# Patient Record
Sex: FEMALE | Race: WHITE | NOT HISPANIC OR LATINO
[De-identification: names, ages, dates, MRNs, and addresses within clinical notes are randomized per-mention and may not be internally consistent; named-entity substitution may affect disease eponyms.]

---

## 2017-07-06 ENCOUNTER — RX ONLY (OUTPATIENT)
Age: 62
Setting detail: RX ONLY
End: 2017-07-06

## 2017-07-06 RX ORDER — LIDOCAINE, PRILOCAINE 25; 25 MG/G; MG/G
CREAM TOPICAL
Qty: 1 | Refills: 1 | Status: ERX

## 2017-07-12 ENCOUNTER — APPOINTMENT (RX ONLY)
Dept: URBAN - METROPOLITAN AREA CLINIC 170 | Facility: CLINIC | Age: 62
Setting detail: DERMATOLOGY
End: 2017-07-12

## 2017-07-12 DIAGNOSIS — Z41.9 ENCOUNTER FOR PROCEDURE FOR PURPOSES OTHER THAN REMEDYING HEALTH STATE, UNSPECIFIED: ICD-10-CM

## 2017-07-12 PROCEDURE — ? FILLERS

## 2017-07-12 PROCEDURE — ? DYSPORT

## 2017-07-12 ASSESSMENT — LOCATION DETAILED DESCRIPTION DERM
LOCATION DETAILED: RIGHT INFERIOR MEDIAL FOREHEAD
LOCATION DETAILED: RIGHT CENTRAL MALAR CHEEK
LOCATION DETAILED: RIGHT MEDIAL MALAR CHEEK

## 2017-07-12 ASSESSMENT — LOCATION ZONE DERM: LOCATION ZONE: FACE

## 2017-07-12 ASSESSMENT — LOCATION SIMPLE DESCRIPTION DERM
LOCATION SIMPLE: RIGHT CHEEK
LOCATION SIMPLE: RIGHT FOREHEAD

## 2017-07-12 NOTE — PROCEDURE: FILLERS
Brows Filler  Volume In Cc: 0
Expiration Date (Month Year): 5/15/16
Expiration Date (Month Year): 4/30/19
Lot #: [id number]
Post-Care Instructions: Patient instructed to apply ice to reduce swelling.
Additional Area 1 Location: Face
Anesthesia Volume In Cc: 0.2
Anesthesia Type: 1% lidocaine with epinephrine and a 1:10 solution of 8.4% sodium bicarbonate
Topical Anesthesia?: 2.5% lidocaine, 2.5% prilocaine
Detail Level: Zone
Price (Use Numbers Only, No Special Characters Or $): 548
Use Map Statement For Sites (Optional): Yes
Lot #: [id number]
Consent: Written consent obtained. Risks include but not limited to bruising, beading, irregular texture, ulceration, infection, allergic reaction, scar formation, incomplete augmentation, temporary nature, procedural pain.
Expiration Date (Month Year): 2/13/19
Additional Area 1 Volume In Cc: 1
Lot #: [id number]
Map Statment: See Attach Map for Complete Details
Filler: Juvederm Volbella XC

## 2017-07-12 NOTE — PROCEDURE: DYSPORT
Dilution (U/ 0.1cc): 10
Inferior Lateral Orbicularis Oculi Units: 0
Lot #: [id number]
Expiration Date (Month Year): 10/31/17
Post-Care Instructions: Patient instructed to not lie down for 4 hours and limit physical activity for 24 hours.
Additional Area 1 Location: Face
Additional Area 1 Units: 148
Detail Level: Zone
Price (Use Numbers Only, No Special Characters Or $): 777
Consent: Written consent obtained. Risks include but not limited to lid/brow ptosis, bruising, swelling, diplopia, temporary effect, incomplete chemical denervation.

## 2017-10-26 ENCOUNTER — RX ONLY (OUTPATIENT)
Age: 62
Setting detail: RX ONLY
End: 2017-10-26

## 2017-10-26 RX ORDER — PHARMACY COMPOUNDING ACCESSORY
EACH MISCELLANEOUS
Qty: 1 | Refills: 1 | Status: ERX | COMMUNITY
Start: 2017-10-26

## 2017-11-08 ENCOUNTER — APPOINTMENT (RX ONLY)
Dept: URBAN - METROPOLITAN AREA CLINIC 170 | Facility: CLINIC | Age: 62
Setting detail: DERMATOLOGY
End: 2017-11-08

## 2017-11-08 DIAGNOSIS — Z41.9 ENCOUNTER FOR PROCEDURE FOR PURPOSES OTHER THAN REMEDYING HEALTH STATE, UNSPECIFIED: ICD-10-CM

## 2017-11-08 PROCEDURE — ? FRAXEL

## 2017-11-08 NOTE — PROCEDURE: FRAXEL
Location: full face except eyelids
Price (Use Numbers Only, No Special Characters Or $): 1400
Energy(Mj/Cm2): 20
Total Energy In Kj (Optional- Don't Include Units): 1.27
Number Of Passes: 4
Treatment Level: 8
Energy(Mj/Cm2): 0
Topical Anesthesia Type: 23% Lidocaine 7% Tetracaine
Detail Level: Zone
Treatment Level: 7
Total Coverage: 35%
Treatment Level: 6
Location: forehead
Wavelength: 1550nm
Energy(Mj/Cm2): 15
Total Coverage: 45%
Total Energy In Kj (Optional- Don't Include Units): 1.89
Location: neck
Wavelength: 1927nm
Length Of Topical Anesthesia Application (Optional): 60 minutes
Treatment Number: 1
Consent: Written consent obtained, risks reviewed including but not limited to pain and incomplete improvement of sun damage.
Add Post-Care Below To The Note: No
Post-Care Instructions: I reviewed with the patient in detail post-care instructions. Patient should avoid sun until area fully healed.
Indication: resurfacing
Total Coverage: 50%
Energy(Mj/Cm2): 20
External Cooling: Tyrel Cryo 6

## 2018-05-22 ENCOUNTER — RX ONLY (OUTPATIENT)
Age: 63
Setting detail: RX ONLY
End: 2018-05-22

## 2018-05-22 RX ORDER — LIDOCAINE, PRILOCAINE 25; 25 MG/G; MG/G
CREAM TOPICAL
Qty: 1 | Refills: 1 | Status: ERX

## 2018-06-27 ENCOUNTER — APPOINTMENT (RX ONLY)
Dept: URBAN - METROPOLITAN AREA CLINIC 170 | Facility: CLINIC | Age: 63
Setting detail: DERMATOLOGY
End: 2018-06-27

## 2018-06-27 DIAGNOSIS — Z41.9 ENCOUNTER FOR PROCEDURE FOR PURPOSES OTHER THAN REMEDYING HEALTH STATE, UNSPECIFIED: ICD-10-CM

## 2018-06-27 PROCEDURE — ? FILLERS

## 2018-06-27 PROCEDURE — ? PATIENT SPECIFIC COUNSELING

## 2018-06-27 PROCEDURE — ? DYSPORT

## 2018-06-27 ASSESSMENT — LOCATION DETAILED DESCRIPTION DERM: LOCATION DETAILED: LEFT INFERIOR CENTRAL MALAR CHEEK

## 2018-06-27 ASSESSMENT — LOCATION SIMPLE DESCRIPTION DERM: LOCATION SIMPLE: LEFT CHEEK

## 2018-06-27 ASSESSMENT — LOCATION ZONE DERM: LOCATION ZONE: FACE

## 2018-06-27 NOTE — PROCEDURE: DYSPORT
Additional Area 6 Units: 0
Expiration Date (Month Year): 12/31/18
Lot #: [id number]
Post-Care Instructions: Patient instructed to not lie down for 4 hours and limit physical activity for 24 hours.
Detail Level: Zone
Dilution (U/ 0.1cc): 10
Price (Use Numbers Only, No Special Characters Or $): 678
Additional Area 1 Location: Face
Additional Area 1 Units: 148
Consent: Written consent obtained. Risks include but not limited to lid/brow ptosis, bruising, swelling, diplopia, temporary effect, incomplete chemical denervation.

## 2018-06-27 NOTE — PROCEDURE: PATIENT SPECIFIC COUNSELING
Recommend Alastin Restorative Skin Complex twice a day to face & neck\\nPlan Fraxel as needed
Detail Level: Zone

## 2018-06-27 NOTE — PROCEDURE: FILLERS
Additional Area 1 Volume In Cc: 0
Additional Area 1 Location: Face
Additional Area 1 Volume In Cc: 2
Price (Use Numbers Only, No Special Characters Or $): 1000
Include Cannula Size?: 25G
Consent: Written consent obtained. Risks include but not limited to bruising, beading, irregular texture, ulceration, infection, allergic reaction, scar formation, incomplete augmentation, temporary nature, procedural pain.
Topical Anesthesia?: 2.5% lidocaine, 2.5% prilocaine
Include Cannula Length?: 1.5 inch
Map Statment: See Attach Map for Complete Details
Lot #: [id number]
Expiration Date (Month Year): 9/30/19, 8/21/19
Expiration Date (Month Year): 11/30/18
Post-Care Instructions: Patient instructed to apply ice to reduce swelling.
Lot #: [id number]
Anesthesia Volume In Cc: 0.2
Include Cannula Information In Note?: Yes
Expiration Date (Month Year): 5/15/16
Detail Level: Zone
Filler: Juvederm Volbella XC
Anesthesia Type: 2% lidocaine with epinephrine
Lot #: [id number]

## 2018-09-06 ENCOUNTER — APPOINTMENT (RX ONLY)
Dept: URBAN - METROPOLITAN AREA CLINIC 166 | Facility: CLINIC | Age: 63
Setting detail: DERMATOLOGY
End: 2018-09-06

## 2018-09-06 DIAGNOSIS — E03.8 OTHER SPECIFIED HYPOTHYROIDISM: ICD-10-CM

## 2018-09-06 PROBLEM — L30.9 DERMATITIS, UNSPECIFIED: Status: ACTIVE | Noted: 2018-09-06

## 2018-09-06 PROCEDURE — 99212 OFFICE O/P EST SF 10 MIN: CPT | Mod: 25

## 2018-09-06 PROCEDURE — ? BIOPSY BY SHAVE METHOD

## 2018-09-06 PROCEDURE — ? COUNSELING

## 2018-09-06 PROCEDURE — ? PRESCRIPTION

## 2018-09-06 PROCEDURE — 11100: CPT

## 2018-09-06 RX ORDER — CLOBETASOL PROPIONATE 0.5 MG/G
CREAM TOPICAL BID
Qty: 1 | Refills: 1 | Status: ERX

## 2018-09-06 ASSESSMENT — LOCATION DETAILED DESCRIPTION DERM
LOCATION DETAILED: RIGHT DISTAL PRETIBIAL REGION
LOCATION DETAILED: LEFT PROXIMAL PRETIBIAL REGION
LOCATION DETAILED: RIGHT PROXIMAL PRETIBIAL REGION

## 2018-09-06 ASSESSMENT — LOCATION SIMPLE DESCRIPTION DERM
LOCATION SIMPLE: LEFT PRETIBIAL REGION
LOCATION SIMPLE: RIGHT PRETIBIAL REGION

## 2018-09-06 ASSESSMENT — LOCATION ZONE DERM: LOCATION ZONE: LEG

## 2018-09-06 NOTE — PROCEDURE: COUNSELING
Detail Level: Simple
Patient Specific Counseling (Will Not Stick From Patient To Patient): She is seeing her endocrinologist next week and will discuss thyroid status

## 2018-09-06 NOTE — PROCEDURE: BIOPSY BY SHAVE METHOD
Wound Care: Aquaphor
Destruction After The Procedure: No
X Size Of Lesion In Cm: 0
Silver Nitrate Text: The wound bed was treated with silver nitrate after the biopsy was performed.
Anesthesia Volume In Cc (Will Not Render If 0): 0.5
Electrodesiccation Text: The wound bed was treated with electrodesiccation after the biopsy was performed.
Biopsy Type: H and E
Post-Care Instructions: I reviewed with the patient in detail post-care instructions. Patient is to keep the biopsy site dry overnight, and then apply Aquaphor twice daily until healed.
Electrodesiccation And Curettage Text: The wound bed was treated with electrodesiccation and curettage after the biopsy was performed.
Was A Bandage Applied: Yes
Notification Instructions: Patient will be notified of biopsy results. However, patient instructed to call the office if not contacted within 2 weeks.
Hemostasis: Aluminum Chloride
Curettage Text: The wound bed was treated with curettage after the biopsy was performed.
Consent: Written consent was obtained and risks were reviewed including but not limited to scarring, infection, bleeding, scabbing, incomplete removal, nerve damage and allergy to anesthesia.
Biopsy Method: Personna blade
Dressing: bandage
Detail Level: Simple
Lab Facility: [id number]
Lab: [id number]
Depth Of Biopsy: dermis
Cryotherapy Text: The wound bed was treated with cryotherapy after the biopsy was performed.
Anesthesia Type: 1% lidocaine with epinephrine 1:100,000 buffered with 8.4% sodium bicarbonate (1:9 ratio)
Type Of Destruction Used: Curettage
Billing Type: Third-Party Bill

## 2018-10-10 ENCOUNTER — APPOINTMENT (RX ONLY)
Dept: URBAN - METROPOLITAN AREA CLINIC 170 | Facility: CLINIC | Age: 63
Setting detail: DERMATOLOGY
End: 2018-10-10

## 2018-10-10 DIAGNOSIS — Z41.9 ENCOUNTER FOR PROCEDURE FOR PURPOSES OTHER THAN REMEDYING HEALTH STATE, UNSPECIFIED: ICD-10-CM

## 2018-10-10 PROCEDURE — ? FRAXEL

## 2018-10-10 ASSESSMENT — LOCATION ZONE DERM
LOCATION ZONE: NECK
LOCATION ZONE: FACE

## 2018-10-10 ASSESSMENT — LOCATION DETAILED DESCRIPTION DERM
LOCATION DETAILED: RIGHT INFERIOR LATERAL NECK
LOCATION DETAILED: RIGHT INFERIOR CENTRAL MALAR CHEEK

## 2018-10-10 ASSESSMENT — LOCATION SIMPLE DESCRIPTION DERM
LOCATION SIMPLE: RIGHT ANTERIOR NECK
LOCATION SIMPLE: RIGHT CHEEK

## 2018-10-10 NOTE — PROCEDURE: FRAXEL
Post-Care Instructions: I reviewed with the patient in detail post-care instructions. Patient should avoid sun until area fully healed.
Indication: resurfacing
Treatment Level: 8
Total Coverage: 23%
Anesthesia Volume In Cc: 0
Treatment Number: 2
Location: perioral area
External Cooling Fan Speed: 6
Energy(Mj/Cm2): 60
Location: forehead
Wavelength: 1550nm
Number Of Passes: 4
Topical Anesthesia Type: 23% Lidocaine 7% Tetracaine
Energy(Mj/Cm2): 30
External Cooling: Tyrel Cryo 6
Price (Use Numbers Only, No Special Characters Or $): 1400
Location: neck
Add Post-Care Below To The Note: No
Energy(Mj/Cm2): 15
Total Energy In Kj (Optional- Don't Include Units): 4.22
Length Of Topical Anesthesia Application (Optional): 90 minutes
Total Coverage: 20%
Total Energy In Kj (Optional- Don't Include Units): 5.03
Treatment Level: 7
Detail Level: Zone
Energy(Mj/Cm2): 20
Consent: Written consent obtained, risks reviewed including but not limited to pain and incomplete improvement of sun damage.

## 2018-10-10 NOTE — HPI: COSMETIC (LASER RESURFACING)
Have You Had Laser Resurfacing Before?: has had previous treatments
When Was Your Last Laser Resurfacing Treatment?: 11/8/17

## 2019-02-08 ENCOUNTER — APPOINTMENT (RX ONLY)
Dept: URBAN - METROPOLITAN AREA CLINIC 170 | Facility: CLINIC | Age: 64
Setting detail: DERMATOLOGY
End: 2019-02-08

## 2019-02-08 DIAGNOSIS — Z41.9 ENCOUNTER FOR PROCEDURE FOR PURPOSES OTHER THAN REMEDYING HEALTH STATE, UNSPECIFIED: ICD-10-CM

## 2019-02-08 PROCEDURE — ? DYSPORT

## 2019-02-08 ASSESSMENT — LOCATION ZONE DERM: LOCATION ZONE: FACE

## 2019-02-08 ASSESSMENT — LOCATION DETAILED DESCRIPTION DERM: LOCATION DETAILED: LEFT INFERIOR CENTRAL MALAR CHEEK

## 2019-02-08 ASSESSMENT — LOCATION SIMPLE DESCRIPTION DERM: LOCATION SIMPLE: LEFT CHEEK

## 2019-02-08 NOTE — PROCEDURE: DYSPORT
Consent: Written consent obtained. Risks include but not limited to lid/brow ptosis, bruising, swelling, diplopia, temporary effect, incomplete chemical denervation.
Additional Area 1 Location: Face
Post-Care Instructions: Patient instructed to not lie down for 4 hours and limit physical activity for 24 hours.
Additional Area 5 Units: 0
Detail Level: Zone
Lot #: [id number]
Additional Area 1 Units: 148
Price (Use Numbers Only, No Special Characters Or $): 194
Expiration Date (Month Year): 8/31/19
Dilution (U/ 0.1cc): 10

## 2019-05-21 ENCOUNTER — APPOINTMENT (RX ONLY)
Dept: URBAN - METROPOLITAN AREA CLINIC 170 | Facility: CLINIC | Age: 64
Setting detail: DERMATOLOGY
End: 2019-05-21

## 2019-05-21 DIAGNOSIS — Z41.9 ENCOUNTER FOR PROCEDURE FOR PURPOSES OTHER THAN REMEDYING HEALTH STATE, UNSPECIFIED: ICD-10-CM

## 2019-05-21 PROCEDURE — ? FRAXEL

## 2019-05-21 ASSESSMENT — LOCATION SIMPLE DESCRIPTION DERM
LOCATION SIMPLE: CHEST
LOCATION SIMPLE: RIGHT ANTERIOR NECK

## 2019-05-21 ASSESSMENT — LOCATION ZONE DERM
LOCATION ZONE: TRUNK
LOCATION ZONE: NECK

## 2019-05-21 ASSESSMENT — LOCATION DETAILED DESCRIPTION DERM
LOCATION DETAILED: RIGHT INFERIOR LATERAL NECK
LOCATION DETAILED: UPPER STERNUM

## 2019-05-21 NOTE — HPI: COSMETIC (LASER RESURFACING)
Have You Had Laser Resurfacing Before?: has had previous treatments
When Was Your Last Laser Resurfacing Treatment?: 10/10/18

## 2019-05-21 NOTE — PROCEDURE: FRAXEL
Location: neck
Topical Anesthesia Type: 23% Lidocaine 7% Tetracaine
Location: forehead
Treatment Level: 8
Energy(Mj/Cm2): 30
External Cooling Fan Speed: 6
Anesthesia Volume In Cc: 0
Price (Use Numbers Only, No Special Characters Or $): 1100
Consent: Written consent obtained, risks reviewed including but not limited to pain and incomplete improvement of sun damage.
Total Coverage: 23%
Total Coverage: 20%
Treatment Number: 3
External Cooling: Tyrel Cryo 6
Location: decollete of the chest
Energy(Mj/Cm2): 30
Detail Level: Zone
Wavelength: 1550nm
Total Energy In Kj (Optional- Don't Include Units): 4.22
Treatment Level: 7
Post-Care Instructions: I reviewed with the patient in detail post-care instructions. Patient should avoid sun until area fully healed.
Add Post-Care Below To The Note: No
Indication: resurfacing
Total Energy In Kj (Optional- Don't Include Units): 3.71
Number Of Passes: 4
Total Energy In Kj (Optional- Don't Include Units): 3.08
Length Of Topical Anesthesia Application (Optional): 90 minutes
Energy(Mj/Cm2): 15

## 2019-05-28 ENCOUNTER — RX ONLY (OUTPATIENT)
Age: 64
Setting detail: RX ONLY
End: 2019-05-28

## 2019-05-28 RX ORDER — LIDOCAINE AND PRILOCAINE 25; 25 MG/G; MG/G
CREAM TOPICAL
Qty: 1 | Refills: 1 | Status: ERX

## 2019-05-30 ENCOUNTER — APPOINTMENT (RX ONLY)
Dept: URBAN - METROPOLITAN AREA CLINIC 170 | Facility: CLINIC | Age: 64
Setting detail: DERMATOLOGY
End: 2019-05-30

## 2019-05-30 DIAGNOSIS — Z41.9 ENCOUNTER FOR PROCEDURE FOR PURPOSES OTHER THAN REMEDYING HEALTH STATE, UNSPECIFIED: ICD-10-CM

## 2019-05-30 PROCEDURE — ? DYSPORT

## 2019-05-30 PROCEDURE — ? FILLERS

## 2019-05-30 NOTE — PROCEDURE: DYSPORT
Lot #: [id number]
Depressor Anguli Oris Units: 0
Detail Level: Zone
Consent: Written consent obtained. Risks include but not limited to lid/brow ptosis, bruising, swelling, diplopia, temporary effect, incomplete chemical denervation.
Price (Use Numbers Only, No Special Characters Or $): 485
Additional Area 1 Units: 148
Dilution (U/ 0.1cc): 10
Expiration Date (Month Year): 12/31/19
Post-Care Instructions: Patient instructed to not lie down for 4 hours and limit physical activity for 24 hours.
Additional Area 1 Location: Face

## 2019-05-30 NOTE — PROCEDURE: FILLERS
Expiration Date (Month Year): 5/15/16
Detail Level: Zone
Tear Troughs Filler  Volume In Cc: 0
Additional Area 1 Volume In Cc: 1
Include Cannula Size?: 25G
Include Cannula Information In Note?: No
Price (Use Numbers Only, No Special Characters Or $): 5328
Include Cannula Length?: 1.5 inch
Additional Area 1 Location: Face
Use Map Statement For Sites (Optional): Yes
Additional Area 1 Volume In Cc: 2
Map Statment: See Attach Map for Complete Details
Consent: Written consent obtained. Risks include but not limited to bruising, beading, irregular texture, ulceration, infection, allergic reaction, scar formation, incomplete augmentation, temporary nature, procedural pain.
Post-Care Instructions: Patient instructed to apply ice to reduce swelling.
Filler: Juvederm Ultra XC
Anesthesia Type: 1% lidocaine with 1:100,000 epinephrine, 1% lidocaine without epinephrine and 8.4% sodium bicarbonate in a 2:2:1 Ratio
Lot #: [id number]
Anesthesia Volume In Cc: 0.2
Expiration Date (Month Year): 4/21/20
Lot #: [id number]
Topical Anesthesia?: 2.5% lidocaine, 2.5% prilocaine
Expiration Date (Month Year): 2/25/20
Lot #: [id number]
Filler: Voluma

## 2019-07-11 ENCOUNTER — APPOINTMENT (RX ONLY)
Dept: URBAN - METROPOLITAN AREA CLINIC 170 | Facility: CLINIC | Age: 64
Setting detail: DERMATOLOGY
End: 2019-07-11

## 2019-07-11 ENCOUNTER — RX ONLY (OUTPATIENT)
Age: 64
Setting detail: RX ONLY
End: 2019-07-11

## 2019-07-11 DIAGNOSIS — I78.8 OTHER DISEASES OF CAPILLARIES: ICD-10-CM

## 2019-07-11 DIAGNOSIS — Z41.9 ENCOUNTER FOR PROCEDURE FOR PURPOSES OTHER THAN REMEDYING HEALTH STATE, UNSPECIFIED: ICD-10-CM

## 2019-07-11 PROCEDURE — ? COUNSELING

## 2019-07-11 PROCEDURE — ? DEFER

## 2019-07-11 PROCEDURE — ? PATIENT SPECIFIC COUNSELING

## 2019-07-11 RX ORDER — TRETINOIN 1 MG/G
CREAM TOPICAL
Qty: 1 | Refills: 6 | Status: ERX

## 2019-07-11 NOTE — PROCEDURE: DEFER
Introduction Text (Please End With A Colon): The following procedure was deferred:
Detail Level: Simple
Scheduling Instructions (Optional): Cool Glide Laser

## 2019-07-11 NOTE — PROCEDURE: PATIENT SPECIFIC COUNSELING
Improvement of rhytides & volume loss; may possibly need more filler to chin (2-3 weeks after Cool Glide) \\nPatient has home microneedling device; will continue to use at this time; consider Pixel 8 RF in future
Detail Level: Zone

## 2019-08-02 ENCOUNTER — APPOINTMENT (RX ONLY)
Dept: URBAN - METROPOLITAN AREA CLINIC 170 | Facility: CLINIC | Age: 64
Setting detail: DERMATOLOGY
End: 2019-08-02

## 2019-08-02 DIAGNOSIS — Z41.9 ENCOUNTER FOR PROCEDURE FOR PURPOSES OTHER THAN REMEDYING HEALTH STATE, UNSPECIFIED: ICD-10-CM

## 2019-08-02 PROCEDURE — ? FRAXEL

## 2019-08-02 ASSESSMENT — LOCATION SIMPLE DESCRIPTION DERM
LOCATION SIMPLE: RIGHT ANTERIOR NECK
LOCATION SIMPLE: CHEST

## 2019-08-02 ASSESSMENT — LOCATION ZONE DERM
LOCATION ZONE: TRUNK
LOCATION ZONE: NECK

## 2019-08-02 ASSESSMENT — LOCATION DETAILED DESCRIPTION DERM
LOCATION DETAILED: UPPER STERNUM
LOCATION DETAILED: RIGHT INFERIOR LATERAL NECK

## 2019-08-02 NOTE — HPI: COSMETIC (LASER RESURFACING)
Have You Had Laser Resurfacing Before?: has had previous treatments
When Was Your Last Laser Resurfacing Treatment?: 5/21/19

## 2019-10-01 ENCOUNTER — APPOINTMENT (RX ONLY)
Dept: URBAN - METROPOLITAN AREA CLINIC 167 | Facility: CLINIC | Age: 64
Setting detail: DERMATOLOGY
End: 2019-10-01

## 2019-10-01 DIAGNOSIS — I78.8 OTHER DISEASES OF CAPILLARIES: ICD-10-CM

## 2019-10-01 PROCEDURE — ? LASER COSMETIC

## 2019-10-01 ASSESSMENT — LOCATION ZONE DERM: LOCATION ZONE: FACE

## 2019-10-01 ASSESSMENT — LOCATION SIMPLE DESCRIPTION DERM: LOCATION SIMPLE: LEFT CHEEK

## 2019-10-01 ASSESSMENT — LOCATION DETAILED DESCRIPTION DERM: LOCATION DETAILED: LEFT INFERIOR MEDIAL MALAR CHEEK

## 2019-10-01 NOTE — PROCEDURE: LASER COSMETIC
Spotsize (Include Units): 3mm
Treatment Number: 1
Cooling: contact
Pre-Procedure Care: Prior to the procedure the patient and all present had protective eyewear in place and a warning sign was placed on the door.
Indication: Telangiectasias
Post-Care Instructions: I reviewed with the patient in detail post-care instructions. Patient should stay away from the sun and wear sun protection until treated areas are fully healed.
Anesthesia Volume In Cc: 0
Render Post-Care In The Note: No
Wavelength (Include Units): 1064 mm
Pulse Duration (Include Units): 20 ms
Anesthesia Type: 1% lidocaine with epinephrine
End-Point And Post-Procedure Care: The procedure continued until mild purpura was noted.  Immediately following the procedure, Vaseline and ice applied. Post care reviewed with patient.
Total Pulses: 48; 4 (upper lip)
Fluence (Include Units): 170 J/cm2; 150J/cm2 (upper lip)
Price (Use Numbers Only, No Special Characters Or $): 268
Laser Type: Cool Glide
Detail Level: Zone
Consent: Prior to the procedure consent obtained, risks reviewed including but not limited to crusting, scabbing, blistering, scarring, darker or lighter pigmentary change, incidental hair removal, bruising, and/or incomplete removal.
Eye Protection: Laser Aid

## 2019-10-21 ENCOUNTER — RX ONLY (OUTPATIENT)
Age: 64
Setting detail: RX ONLY
End: 2019-10-21

## 2019-10-21 RX ORDER — LIDOCAINE AND PRILOCAINE 25; 25 MG/G; MG/G
CREAM TOPICAL
Qty: 1 | Refills: 3 | Status: ERX

## 2019-10-23 ENCOUNTER — APPOINTMENT (RX ONLY)
Dept: URBAN - METROPOLITAN AREA CLINIC 170 | Facility: CLINIC | Age: 64
Setting detail: DERMATOLOGY
End: 2019-10-23

## 2019-10-23 ENCOUNTER — RX ONLY (OUTPATIENT)
Age: 64
Setting detail: RX ONLY
End: 2019-10-23

## 2019-10-23 DIAGNOSIS — Z41.9 ENCOUNTER FOR PROCEDURE FOR PURPOSES OTHER THAN REMEDYING HEALTH STATE, UNSPECIFIED: ICD-10-CM

## 2019-10-23 PROCEDURE — ? DYSPORT

## 2019-10-23 PROCEDURE — ? PATIENT SPECIFIC COUNSELING

## 2019-10-23 PROCEDURE — ? FILLERS

## 2019-10-23 RX ORDER — LIDOCAINE AND PRILOCAINE 25; 25 MG/G; MG/G
GEL PERIODONTAL
Qty: 1 | Refills: 5 | Status: ERX | COMMUNITY
Start: 2019-10-23

## 2019-10-23 NOTE — PROCEDURE: PATIENT SPECIFIC COUNSELING
Discussed microneedling. Recommend skin pen X3 treatments $350/treatment. Pre and post treatment discussed. Handout given to patient.
Detail Level: Zone

## 2019-10-23 NOTE — PROCEDURE: DYSPORT
Expiration Date (Month Year): 2/29/20
Additional Area 2 Units: 0
Detail Level: Zone
Lot #: [id number]
Additional Area 1 Location: Face
Post-Care Instructions: Patient instructed to not lie down for 4 hours and limit physical activity for 24 hours.
Consent: Written consent obtained. Risks include but not limited to lid/brow ptosis, bruising, swelling, diplopia, temporary effect, incomplete chemical denervation.
Dilution (U/ 0.1cc): 10
Additional Area 1 Units: 148
Price (Use Numbers Only, No Special Characters Or $): 096

## 2019-10-23 NOTE — PROCEDURE: FILLERS
Additional Area 1 Location: Face
Post-Care Instructions: Patient instructed to apply ice to reduce swelling.
Tear Troughs Filler  Volume In Cc: 0
Anesthesia Type: 1% lidocaine with epinephrine 1:100,000 buffered with 8.4% sodium bicarbonate (1:9 ratio)
Lot #: [id number]
Anesthesia Volume In Cc: 0.2
Expiration Date (Month Year): 8/12/20
Include Cannula Information In Note?: Yes
Lot #: [id number]
Expiration Date (Month Year): 11/30/20 *mpc special
Topical Anesthesia?: 2.5% lidocaine, 2.5% prilocaine
Include Cannula Information In Note?: No
Lot #: [id number]
Include Cannula Size?: 25G
Filler: Juvederm Ultra XC
Detail Level: Zone
Include Cannula Length?: 1.5 inch
Expiration Date (Month Year): 5/15/16
Additional Area 1 Volume In Cc: 2
Price (Use Numbers Only, No Special Characters Or $): 1400
Map Statment: See Attach Map for Complete Details
Consent: Written consent obtained. Risks include but not limited to bruising, beading, irregular texture, ulceration, infection, allergic reaction, scar formation, incomplete augmentation, temporary nature, procedural pain.

## 2019-11-21 ENCOUNTER — RX ONLY (OUTPATIENT)
Age: 64
Setting detail: RX ONLY
End: 2019-11-21

## 2019-11-21 ENCOUNTER — APPOINTMENT (RX ONLY)
Dept: URBAN - METROPOLITAN AREA CLINIC 173 | Facility: CLINIC | Age: 64
Setting detail: DERMATOLOGY
End: 2019-11-21

## 2019-11-21 DIAGNOSIS — Z41.9 ENCOUNTER FOR PROCEDURE FOR PURPOSES OTHER THAN REMEDYING HEALTH STATE, UNSPECIFIED: ICD-10-CM

## 2019-11-21 PROCEDURE — ? SKINPEN

## 2019-11-21 RX ORDER — BIMATOPROST 0.3 MG/ML
SOLUTION/ DROPS OPHTHALMIC
Qty: 1 | Refills: 6 | Status: ERX | COMMUNITY
Start: 2019-11-21

## 2019-11-21 NOTE — PROCEDURE: SKINPEN
Location #3: Perioral, Cheeks
Consent: Written consent obtained, risks reviewed including but not limited to pain, scarring, infection and incomplete improvement.  Patient understands the procedure is cosmetic in nature and will require out of pocket payment.
Location #2: Under Eyes
Location #1: Forehead, Temples, Nose
Treatment Number (Optional): 1
Detail Level: Zone
Depth In Mm: 1.5
Post-Care Instructions: After the procedure, take precautions agains sun exposure. Do not apply sunscreen for 12 hours after the procedure. Do not apply make-up for 12 hours after the procedure. Avoid alcohol based toners for 10-14 days. After 2-3 days patients can return to their regular skin regimen.
Depth In Mm: 1.25
Depth In Mm: 0.25
Depth In Mm: 0.5
Price (Use Numbers Only, No Special Characters Or $): 968
Serum (Optional): Hydrating B5 Gel

## 2019-12-16 ENCOUNTER — APPOINTMENT (RX ONLY)
Dept: URBAN - METROPOLITAN AREA CLINIC 173 | Facility: CLINIC | Age: 64
Setting detail: DERMATOLOGY
End: 2019-12-16

## 2019-12-16 DIAGNOSIS — Z41.9 ENCOUNTER FOR PROCEDURE FOR PURPOSES OTHER THAN REMEDYING HEALTH STATE, UNSPECIFIED: ICD-10-CM

## 2019-12-16 PROCEDURE — ? SKINPEN

## 2019-12-16 NOTE — HPI: COSMETIC (MICRONEEDLING)
Have You Had Microneedling Treatments Before?: has had a previous microneedling treatment
When Was Your Last Treatment?: 11/21/19

## 2019-12-16 NOTE — PROCEDURE: SKINPEN
Price (Use Numbers Only, No Special Characters Or $): 204
Treatment Number (Optional): 2
Location #2: Under Eyes
Depth In Mm: 0.75
Post-Care Instructions: After the procedure, take precautions agains sun exposure. Do not apply sunscreen for 12 hours after the procedure. Do not apply make-up for 12 hours after the procedure. Avoid alcohol based toners for 10-14 days. After 2-3 days patients can return to their regular skin regimen.
Detail Level: Zone
Depth In Mm: 0.25
Depth In Mm: 0.5
Consent: Written consent obtained, risks reviewed including but not limited to pain, scarring, infection and incomplete improvement.  Patient understands the procedure is cosmetic in nature and will require out of pocket payment.
Serum (Optional): Hydrating B5 Gel
Location #3: Cheeks, Perioral
Location #1: Forehead, Temples, Nose
Depth In Mm: 1.25
Depth In Mm: 1.5

## 2020-01-21 ENCOUNTER — APPOINTMENT (RX ONLY)
Dept: URBAN - METROPOLITAN AREA CLINIC 173 | Facility: CLINIC | Age: 65
Setting detail: DERMATOLOGY
End: 2020-01-21

## 2020-01-21 DIAGNOSIS — Z41.9 ENCOUNTER FOR PROCEDURE FOR PURPOSES OTHER THAN REMEDYING HEALTH STATE, UNSPECIFIED: ICD-10-CM

## 2020-01-21 PROCEDURE — ? SKINPEN

## 2020-01-21 NOTE — PROCEDURE: SKINPEN
Price (Use Numbers Only, No Special Characters Or $): 870
Depth In Mm: 1.25
Post-Care Instructions: After the procedure, take precautions agains sun exposure. Do not apply sunscreen for 12 hours after the procedure. Do not apply make-up for 12 hours after the procedure. Avoid alcohol based toners for 10-14 days. After 2-3 days patients can return to their regular skin regimen.
Depth In Mm: 0.5
Depth In Mm: 1.5
Location #2: Under Eyes
Location #3: Cheeks
Treatment Number (Optional): 3
Detail Level: Zone
Consent: Written consent obtained, risks reviewed including but not limited to pain, scarring, infection and incomplete improvement.  Patient understands the procedure is cosmetic in nature and will require out of pocket payment.
Depth In Mm: 0.25
Location #1: Forehead, Temples, Nose
Serum (Optional): Hydrating B5 Gel
Depth In Mm: 0.75
Location #4: Perioral

## 2020-01-21 NOTE — HPI: COSMETIC (MICRONEEDLING)
Have You Had Microneedling Treatments Before?: has had a previous microneedling treatment
When Was Your Last Treatment?: 12/16/19

## 2021-03-05 ENCOUNTER — RX ONLY (OUTPATIENT)
Age: 66
Setting detail: RX ONLY
End: 2021-03-05

## 2021-03-05 RX ORDER — LIDOCAINE AND PRILOCAINE 25; 25 MG/G; MG/G
CREAM TOPICAL
Qty: 1 | Refills: 3 | Status: ERX

## 2021-04-01 ENCOUNTER — APPOINTMENT (RX ONLY)
Dept: URBAN - METROPOLITAN AREA CLINIC 173 | Facility: CLINIC | Age: 66
Setting detail: DERMATOLOGY
End: 2021-04-01

## 2021-04-01 DIAGNOSIS — Z41.9 ENCOUNTER FOR PROCEDURE FOR PURPOSES OTHER THAN REMEDYING HEALTH STATE, UNSPECIFIED: ICD-10-CM

## 2021-04-01 PROCEDURE — ? DYSPORT

## 2021-04-01 PROCEDURE — ? FILLERS

## 2021-04-01 NOTE — PROCEDURE: FILLERS
Filler: RHA 2
Dorsal Hands Filler  Volume In Cc: 0
Include Cannula Information In Note?: No
Additional Area 1 Location: Face
Price (Use Numbers Only, No Special Characters Or $): 2618
Use Map Statement For Sites (Optional): Yes
Additional Area 1 Volume In Cc: 1
Expiration Date (Month Year): 6/22/2023
Map Statment: See Attach Map for Complete Details
Lot #: [id number]
Filler: RHA 3
Consent: Written consent obtained. Risks include but not limited to bruising, beading, irregular texture, ulceration, infection, allergic reaction, scar formation, incomplete augmentation, temporary nature, procedural pain.
Include Cannula Size?: 25G
Expiration Date (Month Year): 7/5/23
Post-Care Instructions: Patient instructed to apply ice to reduce swelling.
Anesthesia Type: 1% lidocaine with epinephrine 1:100,000 buffered with 8.4% sodium bicarbonate (1:9 ratio)
Include Cannula Length?: 1.5 inch
Anesthesia Volume In Cc: 0.2
Lot #: [id number]
Expiration Date (Month Year): 5/15/16
Lot #: [id number]
Additional Area 1 Volume In Cc: 2
Detail Level: Zone

## 2021-04-01 NOTE — PROCEDURE: DYSPORT
Glabellar Complex Units: 0
Show Right And Left Pupillary Line Units: No
Show Additional Area 6: Yes
Additional Area 1 Location: Face
Consent: Written consent obtained. Risks include but not limited to lid/brow ptosis, bruising, swelling, diplopia, temporary effect, incomplete chemical denervation.
Expiration Date (Month Year): 9/30/21
Detail Level: Zone
Post-Care Instructions: Patient instructed to not lie down for 4 hours and limit physical activity for 24 hours.
Additional Area 1 Units: 148
Price (Use Numbers Only, No Special Characters Or $): 952
Dilution (U/ 0.1cc): 10
Lot #: [id number]

## 2022-03-03 ENCOUNTER — APPOINTMENT (RX ONLY)
Dept: URBAN - METROPOLITAN AREA CLINIC 173 | Facility: CLINIC | Age: 67
Setting detail: DERMATOLOGY
End: 2022-03-03

## 2022-03-03 DIAGNOSIS — Z41.9 ENCOUNTER FOR PROCEDURE FOR PURPOSES OTHER THAN REMEDYING HEALTH STATE, UNSPECIFIED: ICD-10-CM

## 2022-03-03 PROCEDURE — ? COSMETIC CONSULTATION: LASER RESURFACING

## 2022-03-03 PROCEDURE — ? PRESCRIPTION

## 2022-03-03 RX ORDER — VALACYCLOVIR HYDROCHLORIDE 500 MG/1
TABLET, FILM COATED ORAL
Qty: 10 | Refills: 0 | Status: ERX

## 2022-03-03 RX ORDER — CEPHALEXIN 500 MG/1
TABLET ORAL BID
Qty: 14 | Refills: 0 | Status: ERX

## 2022-03-03 ASSESSMENT — LOCATION ZONE DERM: LOCATION ZONE: FACE

## 2022-03-03 ASSESSMENT — LOCATION SIMPLE DESCRIPTION DERM
LOCATION SIMPLE: RIGHT CHEEK
LOCATION SIMPLE: LEFT CHEEK

## 2022-03-03 ASSESSMENT — LOCATION DETAILED DESCRIPTION DERM
LOCATION DETAILED: LEFT SUPERIOR MEDIAL MALAR CHEEK
LOCATION DETAILED: RIGHT SUPERIOR CENTRAL MALAR CHEEK

## 2022-04-15 ENCOUNTER — APPOINTMENT (RX ONLY)
Dept: URBAN - METROPOLITAN AREA CLINIC 173 | Facility: CLINIC | Age: 67
Setting detail: DERMATOLOGY
End: 2022-04-15

## 2022-04-15 DIAGNOSIS — Z41.9 ENCOUNTER FOR PROCEDURE FOR PURPOSES OTHER THAN REMEDYING HEALTH STATE, UNSPECIFIED: ICD-10-CM

## 2022-04-15 PROCEDURE — ? DYSPORT

## 2022-04-15 PROCEDURE — ? FILLERS

## 2022-04-15 NOTE — PROCEDURE: DYSPORT
Glabellar Complex Units: 0
Show Right And Left Pupillary Line Units: No
Show Additional Area 6: Yes
Additional Area 1 Location: Face
Consent: Written consent obtained. Risks include but not limited to lid/brow ptosis, bruising, swelling, diplopia, temporary effect, incomplete chemical denervation.
Expiration Date (Month Year): 9/30/22
Detail Level: Zone
Post-Care Instructions: Patient instructed to not lie down for 4 hours and limit physical activity for 24 hours.
Additional Area 1 Units: 148
Price (Use Numbers Only, No Special Characters Or $): 227
Dilution (U/ 0.1cc): 10
Lot #: [id number]

## 2022-04-15 NOTE — PROCEDURE: FILLERS
Filler: RHA 3
Dorsal Hands Filler  Volume In Cc: 0
Include Cannula Information In Note?: No
Additional Area 1 Location: Face
Price (Use Numbers Only, No Special Characters Or $): 8243
Use Map Statement For Sites (Optional): Yes
Additional Area 1 Volume In Cc: 1
Expiration Date (Month Year): 6/22/2023
Map Statment: See Attach Map for Complete Details
Lot #: [id number]
Consent: Written consent obtained. Risks include but not limited to bruising, beading, irregular texture, ulceration, infection, allergic reaction, scar formation, incomplete augmentation, temporary nature, procedural pain.
Include Cannula Size?: 25G
Expiration Date (Month Year): 9/13/24
Post-Care Instructions: Patient instructed to apply ice to reduce swelling.
Anesthesia Type: 1% lidocaine with epinephrine 1:100,000 buffered with 8.4% sodium bicarbonate (1:9 ratio)
Include Cannula Length?: 1.5 inch
Anesthesia Volume In Cc: 0.2
Lot #: [id number]
Expiration Date (Month Year): 5/15/16
Topical Anesthesia?: 2.5% lidocaine, 2.5% prilocaine
Lot #: [id number]
Additional Area 1 Volume In Cc: 2
Detail Level: Zone

## 2022-10-19 ENCOUNTER — APPOINTMENT (RX ONLY)
Dept: URBAN - METROPOLITAN AREA CLINIC 173 | Facility: CLINIC | Age: 67
Setting detail: DERMATOLOGY
End: 2022-10-19

## 2022-10-19 DIAGNOSIS — Z41.9 ENCOUNTER FOR PROCEDURE FOR PURPOSES OTHER THAN REMEDYING HEALTH STATE, UNSPECIFIED: ICD-10-CM

## 2022-10-19 PROCEDURE — ? DYSPORT

## 2022-10-19 NOTE — PROCEDURE: DYSPORT
Glabellar Complex Units: 0
Show Right And Left Pupillary Line Units: No
Show Additional Area 6: Yes
Additional Area 1 Location: Face
Consent: Written consent obtained. Risks include but not limited to lid/brow ptosis, bruising, swelling, diplopia, temporary effect, incomplete chemical denervation.
Expiration Date (Month Year): 3/31/23
Detail Level: Zone
Post-Care Instructions: Patient instructed to not lie down for 4 hours and limit physical activity for 24 hours.
Additional Area 1 Units: 148
Price (Use Numbers Only, No Special Characters Or $): 459
Dilution (U/ 0.1cc): 10
Lot #: [id number]

## 2023-02-02 ENCOUNTER — APPOINTMENT (RX ONLY)
Dept: URBAN - METROPOLITAN AREA CLINIC 173 | Facility: CLINIC | Age: 68
Setting detail: DERMATOLOGY
End: 2023-02-02

## 2023-02-02 ENCOUNTER — RX ONLY (OUTPATIENT)
Age: 68
Setting detail: RX ONLY
End: 2023-02-02

## 2023-02-02 DIAGNOSIS — Z41.9 ENCOUNTER FOR PROCEDURE FOR PURPOSES OTHER THAN REMEDYING HEALTH STATE, UNSPECIFIED: ICD-10-CM

## 2023-02-02 PROCEDURE — ? DYSPORT

## 2023-02-02 RX ORDER — TRETIONIN 1 MG/G
CREAM TOPICAL
Qty: 45 | Refills: 2 | Status: ERX | COMMUNITY
Start: 2023-02-02

## 2023-02-02 NOTE — PROCEDURE: DYSPORT
Price (Use Numbers Only, No Special Characters Or $): 757
Anterior Platysmal Bands Units: 0
Show Nasal Units: Yes
Show Mentalis Units: No
Additional Area 1 Location: Face
Consent: Written consent obtained. Risks include but not limited to lid/brow ptosis, bruising, swelling, diplopia, temporary effect, incomplete chemical denervation.
Additional Area 1 Units: 148
Post-Care Instructions: Patient instructed to not lie down for 4 hours and limit physical activity for 24 hours.
Dilution (U/ 0.1cc): 10
Lot #: [id number]
Detail Level: Zone
Expiration Date (Month Year): 6/30/23

## 2023-03-02 ENCOUNTER — APPOINTMENT (RX ONLY)
Dept: URBAN - METROPOLITAN AREA CLINIC 173 | Facility: CLINIC | Age: 68
Setting detail: DERMATOLOGY
End: 2023-03-02

## 2023-03-02 DIAGNOSIS — Z41.9 ENCOUNTER FOR PROCEDURE FOR PURPOSES OTHER THAN REMEDYING HEALTH STATE, UNSPECIFIED: ICD-10-CM

## 2023-03-02 PROCEDURE — ? PRESCRIPTION

## 2023-03-02 PROCEDURE — ? COSMETIC CONSULTATION: LASER RESURFACING

## 2023-03-02 RX ORDER — VALACYCLOVIR HYDROCHLORIDE 500 MG/1
TABLET, FILM COATED ORAL
Qty: 10 | Refills: 0 | Status: ERX | COMMUNITY
Start: 2023-03-02

## 2023-03-02 RX ORDER — DIAZEPAM 5 MG/1
TABLET ORAL
Qty: 2 | Refills: 0 | Status: ERX | COMMUNITY
Start: 2023-03-02

## 2023-03-02 RX ORDER — CEPHALEXIN 500 MG/1
TABLET ORAL BID
Qty: 14 | Refills: 0 | Status: ERX | COMMUNITY
Start: 2023-03-02

## 2023-03-02 RX ADMIN — DIAZEPAM: 5 TABLET ORAL at 00:00

## 2023-03-02 RX ADMIN — CEPHALEXIN: 500 TABLET ORAL at 00:00

## 2023-03-02 RX ADMIN — VALACYCLOVIR HYDROCHLORIDE: 500 TABLET, FILM COATED ORAL at 00:00

## 2023-03-02 ASSESSMENT — LOCATION ZONE DERM: LOCATION ZONE: FACE

## 2023-03-02 ASSESSMENT — LOCATION DETAILED DESCRIPTION DERM: LOCATION DETAILED: INFERIOR MID FOREHEAD

## 2023-03-02 ASSESSMENT — LOCATION SIMPLE DESCRIPTION DERM: LOCATION SIMPLE: INFERIOR FOREHEAD

## 2023-03-02 NOTE — PROCEDURE: MIPS QUALITY
Detail Level: Detailed
Quality 110: Preventive Care And Screening: Influenza Immunization: Influenza Immunization not Administered because Patient Refused.
Quality 431: Preventive Care And Screening: Unhealthy Alcohol Use - Screening: Patient not identified as an unhealthy alcohol user when screened for unhealthy alcohol use using a systematic screening method
Quality 111:Pneumonia Vaccination Status For Older Adults: Pneumococcal vaccine (PPSV23) administered on or after patient’s 60th birthday and before the end of the measurement period

## 2023-03-15 ENCOUNTER — APPOINTMENT (RX ONLY)
Dept: URBAN - METROPOLITAN AREA CLINIC 173 | Facility: CLINIC | Age: 68
Setting detail: DERMATOLOGY
End: 2023-03-15

## 2023-03-15 DIAGNOSIS — Z41.9 ENCOUNTER FOR PROCEDURE FOR PURPOSES OTHER THAN REMEDYING HEALTH STATE, UNSPECIFIED: ICD-10-CM

## 2023-03-15 PROCEDURE — ? ULTRAPULSE LASER

## 2023-03-15 PROCEDURE — ? PRO-NOX

## 2023-03-15 ASSESSMENT — LOCATION DETAILED DESCRIPTION DERM
LOCATION DETAILED: LEFT INFERIOR CENTRAL MALAR CHEEK
LOCATION DETAILED: LEFT INFERIOR ANTERIOR NECK

## 2023-03-15 ASSESSMENT — LOCATION ZONE DERM
LOCATION ZONE: FACE
LOCATION ZONE: NECK

## 2023-03-15 ASSESSMENT — LOCATION SIMPLE DESCRIPTION DERM
LOCATION SIMPLE: LEFT CHEEK
LOCATION SIMPLE: LEFT ANTERIOR NECK

## 2023-03-15 NOTE — PROCEDURE: PRO-NOX
Post-Care Instructions: The patient was instructed to call the office if they had any lasting side effects or concerns.
Pre-Procedure Text: The patient was connected to the Pro-Nox machine via mask and positioned appropriately for the anticipated procedure.  Following the procedure they were disconnected and monitored for any lasting side effects prior to leaving the office.
Consent: Written consent obtained, risks reviewed including but not limited to euphoria, light-headedness, nausea, vomiting and dizziness.
Detail Level: Zone
Price (Use Numbers Only, No Special Characters Or $): 0

## 2023-03-15 NOTE — PROCEDURE: ULTRAPULSE LASER
Scan Pattern (Optional): 0
Additional Laser Settings?: no
Additional Laser Settings?: yes
Energy (Mj): 125
Energy (Mj): 90
Amanda: 300
Cool Scan: on
Density- (Set To 0 If Using Density % Field Below): 3
Number Of Pulses: 1
Scan Size (Mm): 7
External Cooling Fan Speed: 5
Scan Size (Mm): 6
Location: neck
Repeat Delay (Sec): 0.3
Anesthesia Type: 1% lidocaine with epinephrine
Price (Use Numbers Only, No Special Characters Or $): 5500
Amanda: 300
Location: Perioral and eyes
Location: upper face
Energy (Mj): 90
Detail Level: Detailed
Location: lower face
Location: nose and eyes
Hsv Prophylaxis Prescription: Valtrex 500mg BID starting the day of procedures and continuing until all sites healed
Consent: Written consent obtained, risks reviewed including but not limited to crusting, scabbing, blistering, scarring, darker or lighter pigmentary change, incomplete improvement of dyschromia, wrinkles, prolonged erythema and facial swelling, infection and bleeding.
Post-Care Instructions: I reviewed with the patient in detail post-care instructions. Patient should avoid sun until area fully healed. Pt should apply vaseline to treated areas, and remove crusts gently with water-vinegar soaks.

## 2023-03-16 ENCOUNTER — APPOINTMENT (RX ONLY)
Dept: URBAN - METROPOLITAN AREA CLINIC 173 | Facility: CLINIC | Age: 68
Setting detail: DERMATOLOGY
End: 2023-03-16

## 2023-03-16 DIAGNOSIS — Z41.9 ENCOUNTER FOR PROCEDURE FOR PURPOSES OTHER THAN REMEDYING HEALTH STATE, UNSPECIFIED: ICD-10-CM

## 2023-03-16 PROCEDURE — ? PRESCRIPTION

## 2023-03-16 PROCEDURE — ? PATIENT SPECIFIC COUNSELING

## 2023-03-16 RX ORDER — FLUCONAZOLE 200 MG/1
TABLET ORAL
Qty: 2 | Refills: 1 | Status: ERX | COMMUNITY
Start: 2023-03-16

## 2023-03-16 RX ADMIN — FLUCONAZOLE: 200 TABLET ORAL at 00:00

## 2023-03-16 ASSESSMENT — LOCATION ZONE DERM: LOCATION ZONE: FACE

## 2023-03-16 ASSESSMENT — LOCATION SIMPLE DESCRIPTION DERM: LOCATION SIMPLE: LEFT CHEEK

## 2023-03-16 ASSESSMENT — LOCATION DETAILED DESCRIPTION DERM: LOCATION DETAILED: LEFT INFERIOR CENTRAL MALAR CHEEK

## 2023-03-16 NOTE — PROCEDURE: PATIENT SPECIFIC COUNSELING
Detail Level: Zone
1 day post CO2 follow up:\\nPatient is doing well and healing within normal limits. Erythema, edema, and crusting present on the face. Post-care regimen is being followed as instructed. Patient has no questions or concerns at this time and was advised to call the office if questions or concerns arise. Will plan another follow up in 1 week. Will send in rx for fluconazole if patient experiences severe itching, may take as rx’d.

## 2023-03-22 ENCOUNTER — APPOINTMENT (RX ONLY)
Dept: URBAN - METROPOLITAN AREA CLINIC 173 | Facility: CLINIC | Age: 68
Setting detail: DERMATOLOGY
End: 2023-03-22

## 2023-03-22 DIAGNOSIS — Z41.9 ENCOUNTER FOR PROCEDURE FOR PURPOSES OTHER THAN REMEDYING HEALTH STATE, UNSPECIFIED: ICD-10-CM

## 2023-03-22 PROCEDURE — ? PRESCRIPTION

## 2023-03-22 PROCEDURE — ? PATIENT SPECIFIC COUNSELING

## 2023-03-22 RX ORDER — MUPIROCIN 20 MG/G
OINTMENT TOPICAL
Qty: 15 | Refills: 0 | Status: ERX | COMMUNITY
Start: 2023-03-22

## 2023-03-22 RX ADMIN — MUPIROCIN: 20 OINTMENT TOPICAL at 00:00

## 2023-03-22 NOTE — PROCEDURE: PATIENT SPECIFIC COUNSELING
Detail Level: Zone
1 week post CO2: \\n\\nPatient is doing well and healing within normal limits. Erythema present on face and neck. Swelling has subsided. Patient not experiencing itching. Erosions present on neck. Will prescribe mupirocin ointment to apply to affected areas on the neck as needed. Patient to continue with post care regimen for one more week but is no longer required to do gauze soaks. Can continue lasercyn spray until it runs out. \\n\\nPatient has no questions or concerns at this time. Will plan to follow-up in one week.
no

## 2023-03-28 ENCOUNTER — APPOINTMENT (RX ONLY)
Dept: URBAN - METROPOLITAN AREA CLINIC 173 | Facility: CLINIC | Age: 68
Setting detail: DERMATOLOGY
End: 2023-03-28

## 2023-03-28 DIAGNOSIS — L82.0 INFLAMED SEBORRHEIC KERATOSIS: ICD-10-CM

## 2023-03-28 DIAGNOSIS — L91.8 OTHER HYPERTROPHIC DISORDERS OF THE SKIN: ICD-10-CM

## 2023-03-28 DIAGNOSIS — Z41.9 ENCOUNTER FOR PROCEDURE FOR PURPOSES OTHER THAN REMEDYING HEALTH STATE, UNSPECIFIED: ICD-10-CM

## 2023-03-28 PROCEDURE — ? PRESCRIPTION

## 2023-03-28 PROCEDURE — ? PATIENT SPECIFIC COUNSELING

## 2023-03-28 PROCEDURE — ? LIQUID NITROGEN (COSMETIC)

## 2023-03-28 RX ADMIN — Medication: at 00:00

## 2023-03-28 ASSESSMENT — LOCATION DETAILED DESCRIPTION DERM
LOCATION DETAILED: LEFT SUPERIOR CENTRAL BUCCAL CHEEK
LOCATION DETAILED: RIGHT LATERAL MALAR CHEEK
LOCATION DETAILED: RIGHT LATERAL FOREHEAD
LOCATION DETAILED: RIGHT LATERAL ZYGOMA
LOCATION DETAILED: LEFT SUPERIOR LATERAL MALAR CHEEK
LOCATION DETAILED: RIGHT SUPERIOR ANTERIOR NECK
LOCATION DETAILED: RIGHT FOREHEAD
LOCATION DETAILED: LEFT INFERIOR LATERAL MALAR CHEEK
LOCATION DETAILED: RIGHT SUPERIOR LATERAL NECK

## 2023-03-28 ASSESSMENT — LOCATION SIMPLE DESCRIPTION DERM
LOCATION SIMPLE: NECK
LOCATION SIMPLE: RIGHT ZYGOMA
LOCATION SIMPLE: RIGHT CHEEK
LOCATION SIMPLE: RIGHT FOREHEAD
LOCATION SIMPLE: RIGHT ANTERIOR NECK
LOCATION SIMPLE: LEFT CHEEK

## 2023-03-28 ASSESSMENT — LOCATION ZONE DERM
LOCATION ZONE: FACE
LOCATION ZONE: NECK

## 2023-03-28 NOTE — PROCEDURE: LIQUID NITROGEN (COSMETIC)
Billing Information: Bill by Static Price
Render Post-Care Instructions In Note?: no
Detail Level: Detailed
Post-Care Instructions: I reviewed with the patient in detail post-care instructions. Patient is to wear sunprotection, and avoid picking at any of the treated lesions. Pt may apply Vaseline to crusted or scabbing areas.
Consent: The patient's consent was obtained including but not limited to risks of crusting, scabbing, blistering, scarring, darker or lighter pigmentary change, recurrence, incomplete removal and infection. The patient understands that the procedure is cosmetic in nature and is not covered by insurance.
Spray Paint Text: The liquid nitrogen was applied to the skin utilizing a spray paint frosting technique.
Show Spray Paint Technique Variable?: Yes
Billing Information: Bill by Static Price

## 2023-03-29 RX ORDER — PHARMACY COMPOUNDING ACCESSORY
EACH MISCELLANEOUS
Qty: 30 | Refills: 3 | Status: ERX | COMMUNITY
Start: 2023-03-28

## 2023-06-13 ENCOUNTER — RX ONLY (OUTPATIENT)
Age: 68
Setting detail: RX ONLY
End: 2023-06-13

## 2023-06-13 RX ORDER — LIDOCAINE AND PRILOCAINE 25; 25 MG/G; MG/G
CREAM TOPICAL
Qty: 30 | Refills: 3 | Status: ERX | COMMUNITY
Start: 2023-06-13

## 2023-06-14 ENCOUNTER — APPOINTMENT (RX ONLY)
Dept: URBAN - METROPOLITAN AREA CLINIC 173 | Facility: CLINIC | Age: 68
Setting detail: DERMATOLOGY
End: 2023-06-14

## 2023-06-14 DIAGNOSIS — Z41.9 ENCOUNTER FOR PROCEDURE FOR PURPOSES OTHER THAN REMEDYING HEALTH STATE, UNSPECIFIED: ICD-10-CM

## 2023-06-14 PROCEDURE — ? DYSPORT

## 2023-06-14 PROCEDURE — ? FILLERS

## 2023-06-14 NOTE — PROCEDURE: DYSPORT
Glabellar Complex Units: 0
Show Right And Left Pupillary Line Units: No
Show Anterior Platysmal Band Units: Yes
Additional Area 1 Location: Face
Additional Area 1 Units: 148
Dilution (U/ 0.1cc): 10
Lot #: [id number]
Consent: Written consent obtained. Risks include but not limited to lid/brow ptosis, bruising, swelling, diplopia, temporary effect, incomplete chemical denervation.
Expiration Date (Month Year): 11/30/23
Post-Care Instructions: Patient instructed to not lie down for 4 hours and limit physical activity for 24 hours.
Detail Level: Zone
Price (Use Numbers Only, No Special Characters Or $): 018

## 2023-06-14 NOTE — PROCEDURE: FILLERS
Brows Filler  Volume In Cc: 0
Filler: Juvederm Ultra Plus
Map Statment: See Attach Map for Complete Details
Filler: RHA Redensity
Include Cannula Information In Note?: No
Include Cannula Size?: 25G
Consent: Written consent obtained. Risks include but not limited to bruising, beading, irregular texture, ulceration, infection, allergic reaction, scar formation, incomplete augmentation, temporary nature, procedural pain.
Post-Care Instructions: Patient instructed to apply ice to reduce swelling.
Include Cannula Length?: 1.5 inch
Aspiration Statement: Aspiration was performed prior to injecting site with filler.
Expiration Date (Month Year): 3/31/2022
Lot #: [id number]**mpc special**
Include Cannula Information In Note?: Yes
Lot #: [id number]
Anesthesia Type: 2% lidocaine with epinephrine and a 1:10 solution of 8.4% sodium bicarbonate
Expiration Date (Month Year): 2/9/24
Expiration Date (Month Year): 6/20/25
Lot #: [id number]
Anesthesia Volume In Cc: 0.2
Additional Area 1 Location: Face
Additional Area 1 Volume In Cc: 1
Topical Anesthesia?: 2.5% lidocaine, 2.5% prilocaine
Detail Level: Zone
Price (Use Numbers Only, No Special Characters Or $): 9119

## 2023-06-14 NOTE — PROCEDURE: MIPS QUALITY
Quality 110: Preventive Care And Screening: Influenza Immunization: Influenza immunization was not ordered or administered, reason not given
Quality 111:Pneumonia Vaccination Status For Older Adults: Patient did not receive any pneumococcal conjugate or polysaccharide vaccine on or after their 60th birthday and before the end of the measurement period
Detail Level: Detailed

## 2023-10-11 ENCOUNTER — APPOINTMENT (RX ONLY)
Dept: URBAN - METROPOLITAN AREA CLINIC 173 | Facility: CLINIC | Age: 68
Setting detail: DERMATOLOGY
End: 2023-10-11

## 2023-10-11 DIAGNOSIS — Z41.9 ENCOUNTER FOR PROCEDURE FOR PURPOSES OTHER THAN REMEDYING HEALTH STATE, UNSPECIFIED: ICD-10-CM

## 2023-10-11 DIAGNOSIS — L91.8 OTHER HYPERTROPHIC DISORDERS OF THE SKIN: ICD-10-CM

## 2023-10-11 PROCEDURE — ? PATIENT SPECIFIC COUNSELING

## 2023-10-11 PROCEDURE — ? BENIGN DESTRUCTION COSMETIC

## 2023-10-11 ASSESSMENT — LOCATION ZONE DERM
LOCATION ZONE: NECK
LOCATION ZONE: TRUNK

## 2023-10-11 ASSESSMENT — LOCATION SIMPLE DESCRIPTION DERM
LOCATION SIMPLE: LEFT ANTERIOR NECK
LOCATION SIMPLE: RIGHT ANTERIOR NECK
LOCATION SIMPLE: CHEST

## 2023-10-11 ASSESSMENT — LOCATION DETAILED DESCRIPTION DERM
LOCATION DETAILED: LEFT CLAVICULAR NECK
LOCATION DETAILED: RIGHT CLAVICULAR NECK
LOCATION DETAILED: LEFT INFERIOR ANTERIOR NECK
LOCATION DETAILED: LEFT INFERIOR LATERAL NECK
LOCATION DETAILED: RIGHT INFERIOR LATERAL NECK
LOCATION DETAILED: STERNAL NOTCH

## 2023-10-11 NOTE — PROCEDURE: PATIENT SPECIFIC COUNSELING
Detail Level: Zone
Patient is happy with results 7 months post CO2 procedure. Discussed skin care routine for maintenance regimen. Discussed exosomes per patient inquiry, recommended exosomes by plated if patient is considering incorporating into regimen.\\nDiscussed considering CO2 procedure every 3-5 years, and a Fraxel treatment yearly for maintenance. $1250/treatment full face, $1500/treatment face and neck. Discussed pre and post treatment expectations. Handout provided.

## 2024-01-17 ENCOUNTER — APPOINTMENT (RX ONLY)
Dept: URBAN - METROPOLITAN AREA CLINIC 173 | Facility: CLINIC | Age: 69
Setting detail: DERMATOLOGY
End: 2024-01-17

## 2024-01-17 DIAGNOSIS — Z41.9 ENCOUNTER FOR PROCEDURE FOR PURPOSES OTHER THAN REMEDYING HEALTH STATE, UNSPECIFIED: ICD-10-CM

## 2024-01-17 PROCEDURE — ? FILLERS

## 2024-01-17 PROCEDURE — ? DYSPORT

## 2024-01-17 NOTE — PROCEDURE: FILLERS
Include Cannula Length?: 1.5 inch
Brows Filler Volume In Cc: 0
Anesthesia Volume In Cc: 0.2
Include Cannula Information In Note?: Yes
Include Cannula Size?: 25G
Topical Anesthesia?: 2.5% lidocaine, 2.5% prilocaine
Lot #: [id number] *mpc special*
Detail Level: Zone
Additional Area 1 Location: Face
Additional Area 1 Volume In Cc: 1
Expiration Date (Month Year): 8/1/24
Consent: Written consent obtained. Risks include but not limited to bruising, beading, irregular texture, ulceration, infection, allergic reaction, scar formation, incomplete augmentation, temporary nature, procedural pain.
Price (Use Numbers Only, No Special Characters Or $): 3149
Include Cannula Information In Note?: No
Post-Care Instructions: Patient instructed to apply ice to reduce swelling.
Filler: RHA Redensity
Map Statment: See Attach Map for Complete Details
Filler: Juvederm Voluma XC
Aspiration Statement: Aspiration was performed prior to injecting site with filler.
Lot #: [id number]
Expiration Date (Month Year): 12/6/25
Lot #: [id number]
Anesthesia Type: 2% lidocaine with epinephrine and a 1:10 solution of 8.4% sodium bicarbonate
Filler: Juvederm Vollure XC
Expiration Date (Month Year): 7/26/24

## 2024-01-17 NOTE — PROCEDURE: DYSPORT
Additional Area 3 Units: 0
Show Depressor Anguli Units: Yes
Show Right And Left Pupillary Line Units: No
Expiration Date (Month Year): 2/28/25
Detail Level: Zone
Price (Use Numbers Only, No Special Characters Or $): 313
Post-Care Instructions: Patient instructed to not lie down for 4 hours and limit physical activity for 24 hours.
Consent: Written consent obtained. Risks include but not limited to lid/brow ptosis, bruising, swelling, diplopia, temporary effect, incomplete chemical denervation.
Additional Area 1 Location: Face
Additional Area 1 Units: 168
Lot #: [id number]
Dilution (U/0.1 Cc): 10

## 2024-07-19 ENCOUNTER — APPOINTMENT (RX ONLY)
Dept: URBAN - METROPOLITAN AREA CLINIC 173 | Facility: CLINIC | Age: 69
Setting detail: DERMATOLOGY
End: 2024-07-19

## 2024-07-19 DIAGNOSIS — Z41.9 ENCOUNTER FOR PROCEDURE FOR PURPOSES OTHER THAN REMEDYING HEALTH STATE, UNSPECIFIED: ICD-10-CM

## 2024-07-19 PROCEDURE — ? DYSPORT

## 2024-07-19 NOTE — PROCEDURE: DYSPORT
Additional Area 3 Units: 0
Show Depressor Anguli Units: Yes
Show Right And Left Pupillary Line Units: No
Expiration Date (Month Year): 12/31/25
Detail Level: Zone
Price (Use Numbers Only, No Special Characters Or $): 466
Post-Care Instructions: Patient instructed to not lie down for 4 hours and limit physical activity for 24 hours.
Consent: Written consent obtained. Risks include but not limited to lid/brow ptosis, bruising, swelling, diplopia, temporary effect, incomplete chemical denervation.
Additional Area 1 Location: Face
Additional Area 1 Units: 168
Lot #: [id number]
Dilution (U/0.1 Cc): 10

## 2025-01-06 ENCOUNTER — RX ONLY (RX ONLY)
Age: 70
End: 2025-01-06

## 2025-01-06 RX ORDER — LIDOCAINE AND PRILOCAINE 25; 25 MG/G; MG/G
CREAM TOPICAL
Qty: 30 | Refills: 3 | Status: ERX | COMMUNITY
Start: 2025-01-06

## 2025-01-07 ENCOUNTER — RX ONLY (RX ONLY)
Age: 70
End: 2025-01-07

## 2025-01-07 RX ORDER — LIDOCAINE AND PRILOCAINE 25; 25 MG/G; MG/G
CREAM TOPICAL
Qty: 30 | Refills: 3 | Status: ERX

## 2025-01-08 ENCOUNTER — APPOINTMENT (OUTPATIENT)
Dept: URBAN - METROPOLITAN AREA CLINIC 173 | Facility: CLINIC | Age: 70
Setting detail: DERMATOLOGY
End: 2025-01-08

## 2025-01-08 DIAGNOSIS — Z41.9 ENCOUNTER FOR PROCEDURE FOR PURPOSES OTHER THAN REMEDYING HEALTH STATE, UNSPECIFIED: ICD-10-CM

## 2025-01-08 PROCEDURE — ? FILLERS

## 2025-01-08 PROCEDURE — ? DYSPORT

## 2025-01-08 NOTE — PROCEDURE: FILLERS
Include Cannula Length?: 1.5 inch
Brows Filler Volume In Cc: 0
Anesthesia Volume In Cc: 0.2
Include Cannula Information In Note?: Yes
Include Cannula Size?: 25G
Topical Anesthesia?: 2.5% lidocaine, 2.5% prilocaine
Lot #: [id number]
Detail Level: Zone
Additional Area 1 Location: Face
Additional Area 1 Volume In Cc: 1
Expiration Date (Month Year): 2/9/25
Consent: Written consent obtained. Risks include but not limited to bruising, beading, irregular texture, ulceration, infection, allergic reaction, scar formation, incomplete augmentation, temporary nature, procedural pain.
Price (Use Numbers Only, No Special Characters Or $): 3590
Include Cannula Information In Note?: No
Post-Care Instructions: Patient instructed to apply ice to reduce swelling.
Filler: RHA Redensity
Map Statment: See Attach Map for Complete Details
Filler: Juvederm Voluma XC
Aspiration Statement: Aspiration was performed prior to injecting site with filler.
Lot #: [id number]
Expiration Date (Month Year): 7/11/26
Lot #: [id number]
Anesthesia Type: 2% lidocaine with epinephrine and a 1:10 solution of 8.4% sodium bicarbonate
Filler: Juvederm Vollure XC
Expiration Date (Month Year): 2/24/25

## 2025-01-08 NOTE — PROCEDURE: DYSPORT
Additional Area 3 Units: 0
Show Depressor Anguli Units: Yes
Show Right And Left Pupillary Line Units: No
Expiration Date (Month Year): 6/30/26
Detail Level: Zone
Price (Use Numbers Only, No Special Characters Or $): 751
Post-Care Instructions: Patient instructed to not lie down for 4 hours and limit physical activity for 24 hours.
Consent: Written consent obtained. Risks include but not limited to lid/brow ptosis, bruising, swelling, diplopia, temporary effect, incomplete chemical denervation.
Additional Area 1 Location: Face
Additional Area 1 Units: 168
Lot #: [id number]
Dilution (U/0.1 Cc): 10

## 2025-07-24 ENCOUNTER — APPOINTMENT (OUTPATIENT)
Dept: URBAN - METROPOLITAN AREA CLINIC 173 | Facility: CLINIC | Age: 70
Setting detail: DERMATOLOGY
End: 2025-07-24

## 2025-07-24 DIAGNOSIS — Z41.9 ENCOUNTER FOR PROCEDURE FOR PURPOSES OTHER THAN REMEDYING HEALTH STATE, UNSPECIFIED: ICD-10-CM

## 2025-07-24 PROCEDURE — ? DYSPORT

## 2025-07-24 PROCEDURE — ? PULSED-DYE LASER

## 2025-07-24 PROCEDURE — ? FILLERS

## 2025-07-24 NOTE — PROCEDURE: DYSPORT
Additional Area 3 Units: 0
Show Depressor Anguli Units: Yes
Show Right And Left Pupillary Line Units: No
Expiration Date (Month Year): 1/31/27
Detail Level: Zone
Price (Use Numbers Only, No Special Characters Or $): 431
Post-Care Instructions: Patient instructed to not lie down for 4 hours and limit physical activity for 24 hours.
Consent: Written consent obtained. Risks include but not limited to lid/brow ptosis, bruising, swelling, diplopia, temporary effect, incomplete chemical denervation.
Additional Area 1 Location: Face
Additional Area 1 Units: 64
Lot #: [id number]
Dilution (U/0.1 Cc): 10

## 2025-07-24 NOTE — PROCEDURE: PULSED-DYE LASER
Laser Type: Vbeam 595nm
Cryogen Time (Ms): 30
Spot Size: 7 mm
Delay Time (Ms): 20
Pulse Duration: 10 ms
Pulse Count: 4
Consent: Written consent obtained, risks reviewed including but not limited to crusting, scabbing, blistering, scarring, darker or lighter pigmentary change, incidental hair removal, bruising, and/or incomplete removal.
Detail Level: Zone
Post-Care Instructions: I reviewed with the patient in detail post-care instructions. Patient should stay away from the sun and wear sun protection until treated areas are fully healed.
Post-Procedure Care: Post care reviewed with patient.
Fluence In J/Cm2 (Optional): 11

## 2025-07-24 NOTE — PROCEDURE: FILLERS
Include Cannula Length?: 1.5 inch
Brows Filler Volume In Cc: 0
Anesthesia Volume In Cc: 0.2
Include Cannula Information In Note?: Yes
Include Cannula Size?: 25G
Topical Anesthesia?: 2.5% lidocaine, 2.5% prilocaine
Detail Level: Zone
Additional Area 1 Location: Face
Additional Area 1 Volume In Cc: 1
Consent: Written consent obtained. Risks include but not limited to bruising, beading, irregular texture, ulceration, infection, allergic reaction, scar formation, incomplete augmentation, temporary nature, procedural pain.
Price (Use Numbers Only, No Special Characters Or $): 7908
Include Cannula Information In Note?: No
Post-Care Instructions: Patient instructed to apply ice to reduce swelling.
Filler: RHA Redensity
Map Statment: See Attach Map for Complete Details
Aspiration Statement: Aspiration was performed prior to injecting site with filler.
Lot #: [id number]
Expiration Date (Month Year): 5/28/27
no